# Patient Record
Sex: FEMALE | Race: BLACK OR AFRICAN AMERICAN | NOT HISPANIC OR LATINO | URBAN - METROPOLITAN AREA
[De-identification: names, ages, dates, MRNs, and addresses within clinical notes are randomized per-mention and may not be internally consistent; named-entity substitution may affect disease eponyms.]

---

## 2022-08-26 ENCOUNTER — EMERGENCY (EMERGENCY)
Facility: HOSPITAL | Age: 47
LOS: 1 days | Discharge: HOME | End: 2022-08-26
Attending: EMERGENCY MEDICINE | Admitting: EMERGENCY MEDICINE

## 2022-08-26 VITALS
TEMPERATURE: 99 F | HEIGHT: 65 IN | WEIGHT: 139.99 LBS | HEART RATE: 74 BPM | OXYGEN SATURATION: 98 % | DIASTOLIC BLOOD PRESSURE: 84 MMHG | RESPIRATION RATE: 18 BRPM | SYSTOLIC BLOOD PRESSURE: 123 MMHG

## 2022-08-26 VITALS
OXYGEN SATURATION: 99 % | RESPIRATION RATE: 16 BRPM | DIASTOLIC BLOOD PRESSURE: 76 MMHG | HEART RATE: 82 BPM | SYSTOLIC BLOOD PRESSURE: 124 MMHG

## 2022-08-26 PROCEDURE — 99284 EMERGENCY DEPT VISIT MOD MDM: CPT

## 2022-08-26 RX ORDER — DEXAMETHASONE 0.5 MG/5ML
8 ELIXIR ORAL ONCE
Refills: 0 | Status: COMPLETED | OUTPATIENT
Start: 2022-08-26 | End: 2022-08-26

## 2022-08-26 RX ORDER — DIPHENHYDRAMINE HCL 50 MG
50 CAPSULE ORAL ONCE
Refills: 0 | Status: COMPLETED | OUTPATIENT
Start: 2022-08-26 | End: 2022-08-26

## 2022-08-26 RX ORDER — FAMOTIDINE 10 MG/ML
20 INJECTION INTRAVENOUS ONCE
Refills: 0 | Status: COMPLETED | OUTPATIENT
Start: 2022-08-26 | End: 2022-08-26

## 2022-08-26 RX ADMIN — FAMOTIDINE 20 MILLIGRAM(S): 10 INJECTION INTRAVENOUS at 13:15

## 2022-08-26 RX ADMIN — Medication 50 MILLIGRAM(S): at 13:15

## 2022-08-26 RX ADMIN — Medication 8 MILLIGRAM(S): at 13:30

## 2022-08-26 NOTE — ED PROVIDER NOTE - CLINICAL SUMMARY MEDICAL DECISION MAKING FREE TEXT BOX
pt arriving to ED for eval of possible allergic reaction. improving at this time. no rash, no sob, no n/v/d. exam unremarkable. no tongue swelling/dhysphagia/drooling. will give benadryl/pepcid/decadron PO and monitor.

## 2022-08-26 NOTE — ED PROVIDER NOTE - OBJECTIVE STATEMENT
47F no pmhx, no meds presenting to ED vis EMS for eval of allergic reaction while at work today. patient states she had her usual smoothie at home, muffin from [employer] and while eating at work felt her tongue getting "itchy" and her throat becoming "scratchy". Patient does add that she used a new sunscreen today on her arms.. denies any f/c/n/v/d/cp/sob/rash/HA/cough/abd pain. otherwise patient cannot recall any new soaps/lotions/foods/meds/detergents that could have lead to this reaction. no ACE-i. no other daily meds.

## 2022-08-26 NOTE — ED ADULT TRIAGE NOTE - CHIEF COMPLAINT QUOTE
BIBA for possible allergic reaction after eating a corn muffin. as per pt, felt her tongue itch and felt heavy and swell slightly. denies sob, trouble breathing. +airway patent

## 2022-08-26 NOTE — ED PROVIDER NOTE - CHIEF COMPLAINT
The patient is a 47y Female complaining of allergic reaction. supine moving head side to side and moving all extremities, in prone req'd stim to lift head/neck

## 2022-08-26 NOTE — ED PROVIDER NOTE - NSFOLLOWUPINSTRUCTIONS_ED_ALL_ED_FT
Allergy Testing    WHAT YOU NEED TO KNOW:    Allergy testing is a way to find out if you are allergic to something, called an allergen. Common allergens include pet dander, pollen, insect bites or stings, and certain foods, such as peanuts. Your healthcare provider will use an allergy test to check your body's response to the allergen. During the test, he or she will watch for small skin reactions that show you are probably allergic. He or she will also watch for a rare but serious reaction that needs immediate treatment. You will need to watch for a reaction that develops later, after you are home.    DISCHARGE INSTRUCTIONS:    Call 911 for any of the following:   •You have any of the following signs or symptoms of anaphylaxis:   Signs and Symptoms of Anaphylaxis      ?Itching, a rash, hives that spread over your body      ?Trouble breathing, swelling in your mouth or throat, or wheezing      ?Feeling you are going to faint          Contact your healthcare provider if:   •You have new or worsening rashes, hives, or itching.      •You have an upset stomach or are vomiting.      •You have stomach cramps or diarrhea.      •You have questions or concerns about your condition or care.      Medicines:   •Antihistamines may be needed if you have a reaction to your allergy test.      •Take your medicine as directed. Contact your healthcare provider if you think your medicine is not helping or if you have side effects. Tell him or her if you are allergic to any medicine. Keep a list of the medicines, vitamins, and herbs you take. Include the amounts, and when and why you take them. Bring the list or the pill bottles to follow-up visits. Carry your medicine list with you in case of an emergency.      Follow up with your healthcare provider as directed: You may need more tests, or treatment for an allergy. Your provider may also refer you to a specialist. Write down your questions so you remember to ask them during your visits.

## 2022-08-26 NOTE — ED PROVIDER NOTE - PROGRESS NOTE DETAILS
----- Message from Taylor Graham sent at 8/17/2017  4:00 PM CDT -----  Patients daughter is requesting a call back concerning patient authorization to go out of network to [facility], contact Evelin at [phone].    Thank you    patient feeling better. advised to use benadryl as needed for the next 24hrs or until symptoms resolution. advised allergist referral for testing and NO longer using sunscreen. patient feeling better. advised to use benadryl as needed for the next 24hrs or until symptoms resolution. advised PMD/allergist referral for testing and NO longer using sunscreen. social work to assist with follow up.

## 2022-08-26 NOTE — ED ADULT NURSE NOTE - OBJECTIVE STATEMENT
Pt presents to ED for allergic reaction tongue getting "itchy" and her throat becoming "scratchy" pt reports eating her usual today , but did [employer] new sunscreen. On arrival pt airway patent speaking in full sentences

## 2022-08-26 NOTE — ED PROVIDER NOTE - PATIENT PORTAL LINK FT
You can access the FollowMyHealth Patient Portal offered by [facility] by registering at the following website: [email or web address]. By joining [employer]’s FollowMyHealth portal, you will also be able to view your health information using other applications (apps) compatible with our system.

## 2022-08-28 DIAGNOSIS — T78.1XXA OTHER ADVERSE FOOD REACTIONS, NOT ELSEWHERE CLASSIFIED, INITIAL ENCOUNTER: ICD-10-CM

## 2022-08-28 DIAGNOSIS — L29.9 PRURITUS, UNSPECIFIED: ICD-10-CM
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
## 2023-11-01 NOTE — ED ADULT TRIAGE NOTE - PATIENT ON (OXYGEN DELIVERY METHOD)
room air
Detail Level: Zone
General Sunscreen Counseling: I recommended a broad spectrum sunscreen with a SPF of 30 or higher.  I explained that SPF 30 sunscreens block approximately 97 percent of the sun's harmful rays.  Sunscreens should be applied at least 15 minutes prior to expected sun exposure and then every 2 hours after that as long as sun exposure continues. If swimming or exercising sunscreen should be reapplied every 45 minutes to an hour after getting wet or sweating.  One ounce, or the equivalent of a shot glass full of sunscreen, is adequate to protect the skin not covered by a bathing suit. I also recommended a lip balm with a sunscreen as well. Sun protective clothing can be used in lieu of sunscreen but must be worn the entire time you are exposed to the sun's rays.